# Patient Record
Sex: FEMALE | Race: WHITE | NOT HISPANIC OR LATINO | URBAN - METROPOLITAN AREA
[De-identification: names, ages, dates, MRNs, and addresses within clinical notes are randomized per-mention and may not be internally consistent; named-entity substitution may affect disease eponyms.]

---

## 2018-04-18 ENCOUNTER — EMERGENCY (EMERGENCY)
Facility: HOSPITAL | Age: 43
LOS: 0 days | Discharge: HOME | End: 2018-04-18
Attending: EMERGENCY MEDICINE | Admitting: EMERGENCY MEDICINE
Payer: MEDICAID

## 2018-04-18 VITALS
WEIGHT: 123.9 LBS | OXYGEN SATURATION: 100 % | HEIGHT: 64 IN | HEART RATE: 93 BPM | DIASTOLIC BLOOD PRESSURE: 116 MMHG | SYSTOLIC BLOOD PRESSURE: 164 MMHG | TEMPERATURE: 99 F | RESPIRATION RATE: 16 BRPM

## 2018-04-18 VITALS — SYSTOLIC BLOOD PRESSURE: 157 MMHG | DIASTOLIC BLOOD PRESSURE: 89 MMHG | HEART RATE: 97 BPM | OXYGEN SATURATION: 99 %
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
## 2018-04-18 DIAGNOSIS — T58.91XA TOXIC EFFECT OF CARBON MONOXIDE FROM UNSPECIFIED SOURCE, ACCIDENTAL (UNINTENTIONAL), INITIAL ENCOUNTER: ICD-10-CM

## 2018-04-18 PROCEDURE — 99283 EMERGENCY DEPT VISIT LOW MDM: CPT | Mod: 25
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
## 2018-04-23 ENCOUNTER — EMERGENCY (EMERGENCY)
Facility: HOSPITAL | Age: 43
LOS: 0 days | Discharge: HOME | End: 2018-04-23
Attending: EMERGENCY MEDICINE | Admitting: EMERGENCY MEDICINE
Payer: MEDICAID

## 2018-04-23 VITALS
DIASTOLIC BLOOD PRESSURE: 109 MMHG | HEART RATE: 96 BPM | OXYGEN SATURATION: 100 % | TEMPERATURE: 98 F | RESPIRATION RATE: 18 BRPM | SYSTOLIC BLOOD PRESSURE: 170 MMHG

## 2018-04-23 VITALS — WEIGHT: 132.06 LBS | HEIGHT: 64 IN

## 2018-04-23 DIAGNOSIS — M54.5 LOW BACK PAIN: ICD-10-CM

## 2018-04-23 LAB
APPEARANCE UR: CLEAR — SIGNIFICANT CHANGE UP
BILIRUB UR-MCNC: NEGATIVE — SIGNIFICANT CHANGE UP
COLOR SPEC: YELLOW — SIGNIFICANT CHANGE UP
DIFF PNL FLD: NEGATIVE — SIGNIFICANT CHANGE UP
GLUCOSE UR QL: 250 MG/DL
KETONES UR-MCNC: NEGATIVE — SIGNIFICANT CHANGE UP
LEUKOCYTE ESTERASE UR-ACNC: NEGATIVE — SIGNIFICANT CHANGE UP
NITRITE UR-MCNC: NEGATIVE — SIGNIFICANT CHANGE UP
PH UR: 7 — SIGNIFICANT CHANGE UP (ref 5–8)
PROT UR-MCNC: NEGATIVE MG/DL — SIGNIFICANT CHANGE UP
SP GR SPEC: 1 — LOW (ref 1.01–1.02)
UROBILINOGEN FLD QL: NEGATIVE MG/DL — SIGNIFICANT CHANGE UP

## 2018-04-23 PROCEDURE — 72100 X-RAY EXAM L-S SPINE 2/3 VWS: CPT | Mod: 26

## 2018-04-23 PROCEDURE — 99284 EMERGENCY DEPT VISIT MOD MDM: CPT

## 2018-04-23 RX ORDER — OXYCODONE AND ACETAMINOPHEN 5; 325 MG/1; MG/1
2 TABLET ORAL ONCE
Qty: 0 | Refills: 0 | Status: DISCONTINUED | OUTPATIENT
Start: 2018-04-23 | End: 2018-04-23

## 2018-04-23 RX ORDER — ESCITALOPRAM OXALATE 10 MG/1
1 TABLET, FILM COATED ORAL
Qty: 0 | Refills: 0 | COMMUNITY

## 2018-04-23 RX ORDER — KETOROLAC TROMETHAMINE 30 MG/ML
30 SYRINGE (ML) INJECTION ONCE
Qty: 0 | Refills: 0 | Status: DISCONTINUED | OUTPATIENT
Start: 2018-04-23 | End: 2018-04-23

## 2018-04-23 RX ORDER — CYCLOBENZAPRINE HYDROCHLORIDE 10 MG/1
10 TABLET, FILM COATED ORAL ONCE
Qty: 0 | Refills: 0 | Status: COMPLETED | OUTPATIENT
Start: 2018-04-23 | End: 2018-04-23

## 2018-04-23 RX ORDER — CYCLOBENZAPRINE HYDROCHLORIDE 10 MG/1
1 TABLET, FILM COATED ORAL
Qty: 15 | Refills: 0 | OUTPATIENT
Start: 2018-04-23

## 2018-04-23 RX ADMIN — Medication 30 MILLIGRAM(S): at 11:48

## 2018-04-23 RX ADMIN — OXYCODONE AND ACETAMINOPHEN 2 TABLET(S): 5; 325 TABLET ORAL at 11:48

## 2018-04-23 RX ADMIN — CYCLOBENZAPRINE HYDROCHLORIDE 10 MILLIGRAM(S): 10 TABLET, FILM COATED ORAL at 11:48

## 2018-04-23 NOTE — ED STATDOCS - ATTENDING CONTRIBUTION TO CARE
I, Tanya Christopher MD,  performed the initial face to face bedside interview with this patient regarding history of present illness, review of symptoms and relevant past medical, social and family history.  I completed an independent physical examination.  I was the initial provider who evaluated this patient. I have signed out the follow up of any pending tests (i.e. labs, radiological studies) to the ACP.  I have communicated the patient’s plan of care and disposition with the ACP.  The history, relevant review of systems, past medical and surgical history, medical decision making, and physical examination was documented by the scribe in my presence and I attest to the accuracy of the documentation.

## 2018-04-23 NOTE — ED STATDOCS - MUSCULOSKELETAL, MLM
range of motion is not limited and there is no muscle tenderness. range of motion is not limited and there is no muscle tenderness. Reproducible pain with bilat SLR.

## 2018-04-23 NOTE — ED STATDOCS - MEDICAL DECISION MAKING DETAILS
41 y/o F c/o worsening back pain over the past few hours with plans to receive xray imaging, Toradol
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
## 2018-04-23 NOTE — ED STATDOCS - PROGRESS NOTE DETAILS
Patient reassessed, feeling better.  demonstrated ability to walk.  Recommended follow up with a spine specialist and reviewed return precautions -Silvino Dodson PA-C

## 2018-04-23 NOTE — ED STATDOCS - OBJECTIVE STATEMENT
43 y/o F with no pertinent PMHx presents to the ED c/o worsening lower back pain over the past few hours that started this morning upon wakening. Pt states that she has experienced this in the past, notes "cyst like balls that pop out on her sides bilat". Pt states that she is having difficulty ambulating secondary to pain, wearing brace here in ED, currently sitting in POC and denies trauma to back, fever, cough, chills, CP, SOB, abd pain, NVD or any other acute c/o at this time. Pt states that she took Ibuprofen this morning with little to no alleviation.

## 2018-04-24 LAB
CULTURE RESULTS: SIGNIFICANT CHANGE UP
SPECIMEN SOURCE: SIGNIFICANT CHANGE UP

## 2018-06-11 ENCOUNTER — TRANSCRIPTION ENCOUNTER (OUTPATIENT)
Age: 43
End: 2018-06-11

## 2018-07-01 ENCOUNTER — OUTPATIENT (OUTPATIENT)
Dept: OUTPATIENT SERVICES | Facility: HOSPITAL | Age: 43
LOS: 1 days | End: 2018-07-01
Payer: MEDICAID

## 2018-07-01 PROCEDURE — G9005: CPT

## 2018-08-01 ENCOUNTER — OUTPATIENT (OUTPATIENT)
Dept: OUTPATIENT SERVICES | Facility: HOSPITAL | Age: 43
LOS: 1 days | End: 2018-08-01
Payer: MEDICAID

## 2018-08-28 DIAGNOSIS — Z71.89 OTHER SPECIFIED COUNSELING: ICD-10-CM

## 2018-11-01 DIAGNOSIS — Z71.89 OTHER SPECIFIED COUNSELING: ICD-10-CM

## 2018-12-13 NOTE — ED ADULT TRIAGE NOTE - DIRECT TO ROOM CARE INITIATED:
18-Apr-2018 04:20 Manual Repair Warning Statement: We plan on removing the manually selected variable below in favor of our much easier automatic structured text blocks found in the previous tab. We decided to do this to help make the flow better and give you the full power of structured data. Manual selection is never going to be ideal in our platform and I would encourage you to avoid using manual selection from this point on, especially since I will be sunsetting this feature. It is important that you do one of two things with the customized text below. First, you can save all of the text in a word file so you can have it for future reference. Second, transfer the text to the appropriate area in the Library tab. Lastly, if there is a flap or graft type which we do not have you need to let us know right away so I can add it in before the variable is hidden. No need to panic, we plan to give you roughly 6 months to make the change.

## 2019-02-04 ENCOUNTER — TRANSCRIPTION ENCOUNTER (OUTPATIENT)
Age: 44
End: 2019-02-04

## 2019-10-28 ENCOUNTER — APPOINTMENT (OUTPATIENT)
Dept: ANTEPARTUM | Facility: CLINIC | Age: 44
End: 2019-10-28

## 2019-11-04 ENCOUNTER — APPOINTMENT (OUTPATIENT)
Dept: ANTEPARTUM | Facility: CLINIC | Age: 44
End: 2019-11-04

## 2019-11-22 ENCOUNTER — TRANSCRIPTION ENCOUNTER (OUTPATIENT)
Age: 44
End: 2019-11-22

## 2020-04-08 ENCOUNTER — APPOINTMENT (OUTPATIENT)
Dept: NEUROLOGY | Facility: CLINIC | Age: 45
End: 2020-04-08
Payer: MEDICAID

## 2020-04-08 PROCEDURE — 99204 OFFICE O/P NEW MOD 45 MIN: CPT | Mod: 95

## 2020-04-08 NOTE — REASON FOR VISIT
[Initial Evaluation] : an initial evaluation [FreeTextEntry1] : tele visit due to corona virus national emergency

## 2020-04-08 NOTE — PHYSICAL EXAM
[General Appearance - Alert] : alert [General Appearance - In No Acute Distress] : in no acute distress [Oriented To Time, Place, And Person] : oriented to person, place, and time [Impaired Insight] : insight and judgment were intact [Cranial Nerves Optic (II)] : visual acuity intact bilaterally,  visual fields full to confrontation, pupils equal round and reactive to light [Cranial Nerves Oculomotor (III)] : extraocular motion intact [Cranial Nerves Facial (VII)] : face symmetrical [Cranial Nerves Vestibulocochlear (VIII)] : hearing was intact bilaterally [Cranial Nerves Accessory (XI - Cranial And Spinal)] : head turning and shoulder shrug symmetric [Cranial Nerves Hypoglossal (XII)] : there was no tongue deviation with protrusion [Motor Handedness Right-Handed] : the patient is right hand dominant [Paresis Pronator Drift Right-Sided] : no pronator drift on the right [Paresis Pronator Drift Left-Sided] : no pronator drift on the left [Past-pointing] : there was no past-pointing [Tremor] : no tremor present

## 2020-04-08 NOTE — DISCUSSION/SUMMARY
[FreeTextEntry1] : 44 year old woman hx of depression with anxiety, ADHD, hx of familial intracranial aneurysm. lost caregivers due to insurance change.\par Discussed options, Will renew Adderall XR 30 mg po QD, Effexor ER 75 mg po QD\par Advised to follow up with PCP, psychiatrist.\par Will need reports of previous MRA of the brain, may need repeat study  later in the year.\par RTO 2-3 months.

## 2020-04-08 NOTE — HISTORY OF PRESENT ILLNESS
[Home] : at home, [unfilled] , at the time of the visit. [Medical Office: ([facility])___] : at ~his/her~ medical office located in [place] [Patient] : the patient [FreeTextEntry1] : 44 year old woman hx of depression, anxiety disorder, ADHD, previously seeing another neurologist, unable to continue with him due to insurance changes. Finds difficulty maintaining herself on task, poor attention, easily distracted. Was taking Adderall Xr 30 mg with good effects, no reported side effects. Did not affect mood, she reports but made it better.\par Was on Effexor Xr 75 mg daily, well tolerated, also stopped taking it due to losing her psychiatrist due to insurance change. Mood so far stable, long history of depression with anxiety, had been on several medications in the past, most recent Effexor.\par Gives hx of familial intracranial aneurysm, herself was getting yearly MRA, last was 6 months ago, no recent symptoms of headaches, dizziness or vertigo, unilateral weakness or numbness, no changes in vision.

## 2020-04-09 DIAGNOSIS — F32.9 MAJOR DEPRESSIVE DISORDER, SINGLE EPISODE, UNSPECIFIED: ICD-10-CM

## 2020-04-09 DIAGNOSIS — F98.8 OTHER SPECIFIED BEHAVIORAL AND EMOTIONAL DISORDERS WITH ONSET USUALLY OCCURRING IN CHILDHOOD AND ADOLESCENCE: ICD-10-CM

## 2020-06-16 ENCOUNTER — RX RENEWAL (OUTPATIENT)
Age: 45
End: 2020-06-16

## 2020-07-22 ENCOUNTER — RX RENEWAL (OUTPATIENT)
Age: 45
End: 2020-07-22

## 2020-10-12 ENCOUNTER — RX RENEWAL (OUTPATIENT)
Age: 45
End: 2020-10-12

## 2020-10-12 ENCOUNTER — APPOINTMENT (OUTPATIENT)
Dept: NEUROLOGY | Facility: CLINIC | Age: 45
End: 2020-10-12

## 2020-10-19 ENCOUNTER — RX RENEWAL (OUTPATIENT)
Age: 45
End: 2020-10-19

## 2020-11-17 ENCOUNTER — TRANSCRIPTION ENCOUNTER (OUTPATIENT)
Age: 45
End: 2020-11-17

## 2020-11-30 ENCOUNTER — APPOINTMENT (OUTPATIENT)
Dept: NEUROLOGY | Facility: CLINIC | Age: 45
End: 2020-11-30
Payer: MEDICAID

## 2020-11-30 VITALS
WEIGHT: 130 LBS | SYSTOLIC BLOOD PRESSURE: 144 MMHG | DIASTOLIC BLOOD PRESSURE: 94 MMHG | TEMPERATURE: 97.5 F | HEIGHT: 64 IN | HEART RATE: 84 BPM | BODY MASS INDEX: 22.2 KG/M2

## 2020-11-30 PROCEDURE — 99214 OFFICE O/P EST MOD 30 MIN: CPT

## 2020-11-30 NOTE — DISCUSSION/SUMMARY
[FreeTextEntry1] : 44 year old woman h xo ADD, doing well on Adderall XR 30 mg po QD. \par No changes at this time.\par Hx of anxiety, symptomatic, reviewed options.\par Plan: increase Effexor  mg po QD.

## 2020-11-30 NOTE — REVIEW OF SYSTEMS
[Decr. Concentrating Ability] : decreased concentrating ability [As Noted in HPI] : as noted in HPI [Suicidal] : not suicidal [Sleep Disturbances] : no sleep disturbances [Anxiety] : anxiety [Negative] : Heme/Lymph

## 2020-11-30 NOTE — HISTORY OF PRESENT ILLNESS
[FreeTextEntry1] : 44 year old woman hx of anxiety, ADD here for f/u visit. Last seen in June 2020, continues on Adderall XR 30 mg po QD, on Effexor XR 75 mg po QD. Adderall works well help her focus, avoid distractions.\par Continues to c/o anxiety, no apnic attacks on Effexor 75 mg, well tolerated, would like to increase it.\par

## 2020-12-02 RX ORDER — DEXTROAMPHETAMINE SULFATE, DEXTROAMPHETAMINE SACCHARATE, AMPHETAMINE SULFATE AND AMPHETAMINE ASPARTATE 7.5; 7.5; 7.5; 7.5 MG/1; MG/1; MG/1; MG/1
30 CAPSULE, EXTENDED RELEASE ORAL
Qty: 30 | Refills: 0 | Status: DISCONTINUED | COMMUNITY
Start: 2019-12-09 | End: 2020-12-02

## 2021-01-25 ENCOUNTER — APPOINTMENT (OUTPATIENT)
Dept: NEUROLOGY | Facility: CLINIC | Age: 46
End: 2021-01-25

## 2021-02-17 RX ORDER — DEXTROAMPHETAMINE SACCHARATE, AMPHETAMINE ASPARTATE MONOHYDRATE, DEXTROAMPHETAMINE SULFATE AND AMPHETAMINE SULFATE 7.5; 7.5; 7.5; 7.5 MG/1; MG/1; MG/1; MG/1
30 CAPSULE, EXTENDED RELEASE ORAL
Qty: 30 | Refills: 0 | Status: ACTIVE | COMMUNITY
Start: 2020-10-19 | End: 1900-01-01

## 2021-02-17 RX ORDER — VENLAFAXINE HYDROCHLORIDE 75 MG/1
75 CAPSULE, EXTENDED RELEASE ORAL DAILY
Qty: 60 | Refills: 2 | Status: ACTIVE | COMMUNITY
Start: 2019-12-09 | End: 1900-01-01

## 2021-03-29 ENCOUNTER — APPOINTMENT (OUTPATIENT)
Dept: NEUROLOGY | Facility: CLINIC | Age: 46
End: 2021-03-29

## 2024-02-06 NOTE — ED PROVIDER NOTE - MEDICAL DECISION MAKING DETAILS
Patient is wanting to know when she will get a call back re: clearance for upcoming surgery.  
Test results are normal.
s/p CO exposure.  no complaints.  will d/c with f/u.  CO level in ED 4.  smoking history